# Patient Record
Sex: FEMALE | Race: WHITE | NOT HISPANIC OR LATINO | Employment: OTHER | URBAN - NONMETROPOLITAN AREA
[De-identification: names, ages, dates, MRNs, and addresses within clinical notes are randomized per-mention and may not be internally consistent; named-entity substitution may affect disease eponyms.]

---

## 2023-11-17 PROBLEM — R63.5 ABNORMAL WEIGHT GAIN: Status: ACTIVE | Noted: 2023-11-17

## 2023-11-17 PROBLEM — R05.9 COUGH: Status: ACTIVE | Noted: 2023-11-17

## 2023-11-17 PROBLEM — R93.2 ABNORMAL PET SCAN OF LIVER: Status: ACTIVE | Noted: 2023-11-17

## 2023-11-17 PROBLEM — C50.919 BREAST CANCER (MULTI): Status: ACTIVE | Noted: 2023-11-17

## 2023-11-17 PROBLEM — E66.3 OVERWEIGHT: Status: ACTIVE | Noted: 2023-11-17

## 2023-11-17 PROBLEM — S83.207A ACUTE MENISCAL TEAR OF LEFT KNEE: Status: ACTIVE | Noted: 2023-11-17

## 2023-11-17 PROBLEM — R94.5 ABNORMAL PET SCAN OF LIVER: Status: ACTIVE | Noted: 2023-11-17

## 2023-11-17 PROBLEM — E83.52 HYPERCALCEMIA: Status: ACTIVE | Noted: 2023-11-17

## 2023-11-17 PROBLEM — R39.9 UTI SYMPTOMS: Status: ACTIVE | Noted: 2023-11-17

## 2023-11-17 PROBLEM — E78.5 HYPERLIPEMIA: Status: ACTIVE | Noted: 2023-11-17

## 2023-11-17 PROBLEM — R35.0 URINARY FREQUENCY: Status: ACTIVE | Noted: 2023-11-17

## 2023-11-17 PROBLEM — L59.8 RADIATION SKIN FIBROSIS FROM THERAPEUTIC PROCEDURE: Status: ACTIVE | Noted: 2023-11-17

## 2023-11-17 PROBLEM — F41.9 ANXIETY: Status: ACTIVE | Noted: 2023-11-17

## 2023-11-17 PROBLEM — M76.60 PAIN IN ACHILLES TENDON: Status: ACTIVE | Noted: 2023-11-17

## 2023-11-17 PROBLEM — R10.9 ABDOMINAL PAIN: Status: ACTIVE | Noted: 2023-11-17

## 2023-11-17 PROBLEM — R11.0 NAUSEA IN ADULT: Status: ACTIVE | Noted: 2023-11-17

## 2023-11-17 PROBLEM — T85.44XA CAPSULAR CONTRACTURE OF BREAST IMPLANT: Status: ACTIVE | Noted: 2023-11-17

## 2023-11-17 PROBLEM — R91.8 PULMONARY NODULES/LESIONS, MULTIPLE: Status: ACTIVE | Noted: 2023-11-17

## 2023-11-17 PROBLEM — M25.462 KNEE EFFUSION, LEFT: Status: ACTIVE | Noted: 2023-11-17

## 2023-11-17 PROBLEM — N89.8 VAGINAL ODOR: Status: ACTIVE | Noted: 2023-11-17

## 2023-11-17 PROBLEM — E55.9 VITAMIN D DEFICIENCY: Status: ACTIVE | Noted: 2023-11-17

## 2023-11-17 PROBLEM — Z90.13 STATUS POST BILATERAL MASTECTOMY: Status: ACTIVE | Noted: 2023-11-17

## 2023-11-17 PROBLEM — B37.31 YEAST INFECTION OF THE VAGINA: Status: ACTIVE | Noted: 2023-11-17

## 2023-11-17 PROBLEM — M25.561 RIGHT KNEE PAIN: Status: ACTIVE | Noted: 2023-11-17

## 2023-11-17 PROBLEM — Z85.3 HISTORY OF BREAST CANCER: Status: ACTIVE | Noted: 2023-11-17

## 2023-11-17 PROBLEM — M85.80 OSTEOPENIA: Status: ACTIVE | Noted: 2023-11-17

## 2023-11-17 RX ORDER — CALCIUM CARBONATE 600 MG
600 TABLET ORAL
COMMUNITY

## 2023-11-17 RX ORDER — ONDANSETRON 4 MG/1
4 TABLET, FILM COATED ORAL
COMMUNITY
Start: 2022-03-01 | End: 2024-05-21 | Stop reason: SDUPTHER

## 2023-11-17 RX ORDER — ALENDRONATE SODIUM 70 MG/1
70 TABLET ORAL
COMMUNITY
Start: 2021-12-23

## 2023-11-17 RX ORDER — NALTREXONE HYDROCHLORIDE AND BUPROPION HYDROCHLORIDE 8; 90 MG/1; MG/1
2 TABLET, EXTENDED RELEASE ORAL 2 TIMES DAILY
COMMUNITY
Start: 2022-03-01

## 2023-11-17 RX ORDER — ANASTROZOLE 1 MG/1
TABLET ORAL
COMMUNITY
End: 2024-05-16 | Stop reason: SDUPTHER

## 2023-11-24 ENCOUNTER — APPOINTMENT (OUTPATIENT)
Dept: RADIOLOGY | Facility: CLINIC | Age: 58
End: 2023-11-24
Payer: COMMERCIAL

## 2024-01-05 ENCOUNTER — APPOINTMENT (OUTPATIENT)
Dept: RADIOLOGY | Facility: CLINIC | Age: 59
End: 2024-01-05
Payer: COMMERCIAL

## 2024-01-05 ENCOUNTER — HOSPITAL ENCOUNTER (OUTPATIENT)
Dept: RADIATION ONCOLOGY | Facility: CLINIC | Age: 59
Setting detail: RADIATION/ONCOLOGY SERIES
Discharge: HOME | End: 2024-01-05
Payer: COMMERCIAL

## 2024-01-05 VITALS
DIASTOLIC BLOOD PRESSURE: 74 MMHG | TEMPERATURE: 98.4 F | RESPIRATION RATE: 18 BRPM | SYSTOLIC BLOOD PRESSURE: 136 MMHG | HEART RATE: 74 BPM | OXYGEN SATURATION: 95 %

## 2024-01-05 DIAGNOSIS — C50.919 MALIGNANT NEOPLASM OF FEMALE BREAST, UNSPECIFIED ESTROGEN RECEPTOR STATUS, UNSPECIFIED LATERALITY, UNSPECIFIED SITE OF BREAST (MULTI): Primary | ICD-10-CM

## 2024-01-05 PROCEDURE — 99213 OFFICE O/P EST LOW 20 MIN: CPT | Performed by: NURSE PRACTITIONER

## 2024-01-05 ASSESSMENT — PAIN SCALES - GENERAL: PAINLEVEL: 0-NO PAIN

## 2024-01-05 NOTE — PROGRESS NOTES
Radiation Oncology Follow-Up    Patient Name:  Buffy River  MRN:  [id number]  :  1965    Referring Provider: No ref. provider found  Primary Care Provider: Brittany Eduardo DO  Care Team: Patient Care Team:  Brittany Eduardo DO as PCP - LEANDRA Damian-CNP as PCP - Pierre BROWNING PCP    Date of Service: 2024        Cancer Staging:          Breast         AJCC Edition: 7th (AJCC), Diagnosis Date: 09-Oct-2008, IA, T1b pN0 M0 G1     Treatment Synopsis:    Ms. River is a 58-year-old woman with a history of:   1. T1b N0 M0, stage IA invasive ductal carcinoma of the left breast, status post left mastectomy with sentinel lymph node biopsy in . She received 5 years of tamoxifen. She also had a prophylactic right mastectomy with immediate reconstruction in  .   2. Left chest wall recurrence excised with negative margins in . Following excision of recurrence, she received radiation therapy to the left supraclavicular fossa and left chest wall to a dose of 50 Gy with incision boost to 60 Gy, completed on 2015. Tumor receptors were ER/MS positive, HER-2 negative. She was initiated on Lupron and anastrozole post radiation. She has since had bilateral oophorectomy.     SUBJECTIVE  History of Present Illness:   Buffy River is here today for routine radiation follow up.  She is feeling well with no new health concerns except she has steadily been gaining weight since oophorectomy. She does try to exercise routinely. 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  She continues anastrozole and tolerating  without significant side effects.  Denies: headaches, fever, chills, N/V, chest wall pain, cough, SOB, lymphedema, or bony pain. Continues on bisphosphonate weekly per her GYN provider.        Review of Systems:    Review of Systems - Oncology    Performance Status:   The Karnofsky performance scale today is 100, Fully active, able to carry on all  pre-disease performed without restriction (ECOG equivalent 0).        OBJECTIVE  Vital Signs:  /74 (BP Location: Left arm, Patient Position: Sitting, BP Cuff Size: Adult)   Pulse 74   Temp 36.9 °C (98.4 °F) (Core)   Resp 18   SpO2 95%      Current Outpatient Medications:     alendronate (Fosamax) 70 mg tablet, Take 1 tablet (70 mg) by mouth., Disp: , Rfl:     anastrozole (Arimidex) 1 mg tablet, Take by mouth., Disp: , Rfl:     calcium carbonate 600 mg calcium (1,500 mg) tablet, Take 1 tablet (600 mg) by mouth., Disp: , Rfl:     naltrexone-bupropion (Contrave) 8-90 mg ER tablet, Take 2 tablets by mouth 2 times a day., Disp: , Rfl:     ondansetron (Zofran) 4 mg tablet, Take 1 tablet (4 mg) by mouth every 4 hours., Disp: , Rfl:      Physical Exam  Constitutional:       Appearance: Normal appearance.   HENT:      Head: Normocephalic and atraumatic.      Nose: Nose normal.      Mouth/Throat:      Mouth: Mucous membranes are moist.      Pharynx: Oropharynx is clear.   Eyes:      Conjunctiva/sclera: Conjunctivae normal.      Pupils: Pupils are equal, round, and reactive to light.   Cardiovascular:      Rate and Rhythm: Normal rate and regular rhythm.      Heart sounds: Normal heart sounds. No murmur heard.     No gallop.   Pulmonary:      Effort: Pulmonary effort is normal.      Breath sounds: Normal breath sounds.   Chest:   Breasts:     Right: Absent.      Left: Absent.      Comments: Bilateral mastectomy incisions well healed. Bilateral implant reconstruction. No palpable nodules   Abdominal:      Palpations: Abdomen is soft.   Musculoskeletal:         General: No swelling. Normal range of motion.      Cervical back: Normal range of motion.   Lymphadenopathy:      Cervical: No cervical adenopathy.      Upper Body:      Right upper body: No supraclavicular or axillary adenopathy.      Left upper body: No supraclavicular or axillary adenopathy.   Skin:     General: Skin is warm and dry.   Neurological:       General: No focal deficit present.      Mental Status: She is alert and oriented to person, place, and time.   Psychiatric:         Mood and Affect: Mood normal.         Behavior: Behavior normal.           ASSESSMENT/PLAN:  58 y.o. female with early stage breast cancer in 2000 with chest wall recurrence treated with radiation approx 9 yrs ago.  She is clinically without evidence for recurrence.   She will continue anastrozole and follow up with Krystal Domingo CNP.  I will see her in 1 yr per her request.   She will call with any questions or concerns.     Maricarmen Medina CNP  [phone]

## 2024-01-08 ENCOUNTER — ANCILLARY PROCEDURE (OUTPATIENT)
Dept: RADIOLOGY | Facility: CLINIC | Age: 59
End: 2024-01-08
Payer: COMMERCIAL

## 2024-01-08 DIAGNOSIS — Z78.0 ASYMPTOMATIC MENOPAUSAL STATE: ICD-10-CM

## 2024-01-08 PROCEDURE — 77080 DXA BONE DENSITY AXIAL: CPT

## 2024-01-08 PROCEDURE — 77080 DXA BONE DENSITY AXIAL: CPT | Performed by: RADIOLOGY

## 2024-01-10 NOTE — RESULT ENCOUNTER NOTE
The bone density test was normal at the spine.  There is mild bone thinning or osteopenia of the left hip, T-score -1.2.  I recommend calcium, vitamin D and weightbearing exercise.  The next bone density test is due in 2 or 3 years.

## 2024-05-16 ENCOUNTER — TELEPHONE (OUTPATIENT)
Dept: ADMISSION | Facility: HOSPITAL | Age: 59
End: 2024-05-16
Payer: COMMERCIAL

## 2024-05-16 DIAGNOSIS — C50.919 MALIGNANT NEOPLASM OF FEMALE BREAST, UNSPECIFIED ESTROGEN RECEPTOR STATUS, UNSPECIFIED LATERALITY, UNSPECIFIED SITE OF BREAST (MULTI): Primary | ICD-10-CM

## 2024-05-16 RX ORDER — ANASTROZOLE 1 MG/1
1 TABLET ORAL DAILY
Qty: 90 TABLET | Refills: 3 | Status: SHIPPED | OUTPATIENT
Start: 2024-05-16 | End: 2025-05-16

## 2024-05-16 NOTE — TELEPHONE ENCOUNTER
Buffy River called the refill line for Anastrozole. Medication pended to team to approve and submit. Next FUV is 05/21/24.

## 2024-05-17 ENCOUNTER — TELEPHONE (OUTPATIENT)
Dept: ADMISSION | Facility: HOSPITAL | Age: 59
End: 2024-05-17
Payer: COMMERCIAL

## 2024-05-17 NOTE — TELEPHONE ENCOUNTER
Buffy called and thought she needed a week of Anastrazole since her RX at [facility] would take a week to get here but she realized on phone she has enough Anastrazole and doesn't need anymore pills until her [facility] delivery arrives. She will call us if has any further needs.

## 2024-05-20 PROBLEM — Z78.0 MENOPAUSE: Status: ACTIVE | Noted: 2024-05-20

## 2024-05-20 PROBLEM — Z85.3 ENCOUNTER FOR FOLLOW-UP SURVEILLANCE OF BREAST CANCER: Status: ACTIVE | Noted: 2024-05-20

## 2024-05-20 PROBLEM — C50.912 MALIGNANT NEOPLASM OF LEFT BREAST IN FEMALE, ESTROGEN RECEPTOR POSITIVE (MULTI): Status: ACTIVE | Noted: 2024-05-20

## 2024-05-20 PROBLEM — Z08 ENCOUNTER FOR FOLLOW-UP SURVEILLANCE OF BREAST CANCER: Status: ACTIVE | Noted: 2024-05-20

## 2024-05-20 PROBLEM — Z17.0 MALIGNANT NEOPLASM OF LEFT BREAST IN FEMALE, ESTROGEN RECEPTOR POSITIVE (MULTI): Status: ACTIVE | Noted: 2024-05-20

## 2024-05-20 PROBLEM — Z79.811 AROMATASE INHIBITOR USE: Status: ACTIVE | Noted: 2024-05-20

## 2024-05-21 ENCOUNTER — OFFICE VISIT (OUTPATIENT)
Dept: HEMATOLOGY/ONCOLOGY | Facility: CLINIC | Age: 59
End: 2024-05-21
Payer: COMMERCIAL

## 2024-05-21 VITALS
SYSTOLIC BLOOD PRESSURE: 118 MMHG | DIASTOLIC BLOOD PRESSURE: 73 MMHG | HEART RATE: 73 BPM | BODY MASS INDEX: 28.12 KG/M2 | HEIGHT: 70 IN

## 2024-05-21 DIAGNOSIS — Z17.0 MALIGNANT NEOPLASM OF LEFT BREAST IN FEMALE, ESTROGEN RECEPTOR POSITIVE, UNSPECIFIED SITE OF BREAST (MULTI): ICD-10-CM

## 2024-05-21 DIAGNOSIS — Z79.811 AROMATASE INHIBITOR USE: ICD-10-CM

## 2024-05-21 DIAGNOSIS — M85.80 OSTEOPENIA, UNSPECIFIED LOCATION: ICD-10-CM

## 2024-05-21 DIAGNOSIS — Z85.3 ENCOUNTER FOR FOLLOW-UP SURVEILLANCE OF BREAST CANCER: ICD-10-CM

## 2024-05-21 DIAGNOSIS — Z90.13 STATUS POST BILATERAL MASTECTOMY: Primary | ICD-10-CM

## 2024-05-21 DIAGNOSIS — Z87.898 HX OF NAUSEA: ICD-10-CM

## 2024-05-21 DIAGNOSIS — C50.912 MALIGNANT NEOPLASM OF LEFT BREAST IN FEMALE, ESTROGEN RECEPTOR POSITIVE, UNSPECIFIED SITE OF BREAST (MULTI): ICD-10-CM

## 2024-05-21 DIAGNOSIS — Z78.0 MENOPAUSE: ICD-10-CM

## 2024-05-21 DIAGNOSIS — Z08 ENCOUNTER FOR FOLLOW-UP SURVEILLANCE OF BREAST CANCER: ICD-10-CM

## 2024-05-21 PROCEDURE — 99215 OFFICE O/P EST HI 40 MIN: CPT | Performed by: NURSE PRACTITIONER

## 2024-05-21 RX ORDER — ONDANSETRON 4 MG/1
4 TABLET, FILM COATED ORAL EVERY 12 HOURS PRN
Qty: 3 TABLET | Refills: 0 | Status: SHIPPED | OUTPATIENT
Start: 2024-05-21

## 2024-05-21 ASSESSMENT — PAIN SCALES - GENERAL: PAINLEVEL: 0-NO PAIN

## 2024-05-21 NOTE — PATIENT INSTRUCTIONS
Continue daily Anastrazole. I have updated the prescription today for a full year    PRN prescription today for intermittent rare nausea.      DEXA osteopenia 11/2021, repeat Jan 2024 unchanged osteopenia. Continue weight bearing, vitamin D and calcium supplementation.      Krystal MOBLEY CNP follow-up annually May/ June 2025     follow-up with Dr. Sri Nice gyn as scheduled       follow-up office visit with Maricarmen Medina CNP annually Job  10 2025     PCP follow-up with Dr. Mills annually and as needed for general health maintenance and labs     Call with any questions, concerns or treatment intolerance prior to next office visit [phone].

## 2024-05-21 NOTE — PROGRESS NOTES
"Patient ID: Buffy River is a 58 y.o. female.  BREAST CANCER DIAGNOSIS:  Breast         AJCC Edition: 7th (AJCC), Diagnosis Date: 09-Oct-2008, IA, T1b pN0 M0 G1        Treatment History:    1. Mammogram 3/27/08.  Subsequently, presented with left  breast nipple discharge and underwent a mammogram on 8/26/08 that was negative but ultrasound revealed nodules.  MRI breast 9/10/08 revealed 3 masses. 9/17/08 - U/S guided biopsy of 3 masses in left breast- 12:00 mass, 10-11:00 mass, 9-10:00 mass and all 3 revealed invasive ductal carcinoma, grade 1.     2. 10/9/08- Bilateral mastectomies which revealed left breast multifocal areas of IDC , grade 1, largest 0.9 cm, negative margins, 0/5 LN involved, no angiolymphatic invasion;  invasive ductal carcinoma, ER+ 96%, MD+ 75%, HER2 1+.  Right breast revealed 1.3cm area of  LCIS.  Oncotype dx recurrence score of 10 .  Path staging- [id number]     3. 11/19/08- 11/2013- adjuvant tamoxifen.  Was also on Zometa Q6 M 1/2009-3/2011 along with  tamoxifen.     4. BRCA germline test negative per clinic notes around 2008.      5. There was a \"red dot\" on her left reconstructed nipple 5/2015  and showed this to her dermatologist. Skin biopsy showed low-grade ductal mammary adenocarcinoma. She was also found to have a mass in the superficial area of the left chest wall near the reconstructed areola.  Biopsy 5/28/15 showed invasive ductal carcinoma, grade 1, ER >95% , MD >95%, HER-2 negative.     6. PET/CT showed no evidence of metastatic disease. Her case was discussed at interdisciplinary breast tumor conference yesterday. The recommendations were for an excision of the mass and overlying skin, postoperative radiation, medical oncology referral  and hormonal therapy. Genetics consult and consideration of chemotherapy recommended.     7. Left mastectomy re-excision 6/18/15 showed an invasive ductal carcinoma that  was about 2 cm in size.      8. Radiation therapy to the left chest wall and " supraclavicular region 8/13/15 through 9/22/15     9. Lupron initiated 8/11/15 as she was still having menses regularly.     10. Anastrozole initiated mid 2015    11. 2016 Oophorectomy       Past Medical History: Buffy has a past medical history of Personal history of malignant neoplasm of breast.  Surgical History:  Buffy has a past surgical history that includes Other surgical history (2014) and Other surgical history (2019).  Social History: Moved to [place] 2023, , x2 adult daughters. Runs a local [employer]   Social Substance History:  ·  Smoking Status never smoker (1)   ·  Additional History          Breast cancer risk factors:   Menarche 12    Breastfeeding- 2.5 yrs for one and 1 yr for the other  Age at 1st live birth 34  OCP- clomid for 1 yr at age 14  Fertility drugs- injections for 1.5 yrs  Prior XRT- none  Prior breast biopsy- none prior to breast cancer diagnosis      Family History & Family Oncology History:  Maternal GM dx'ed with BC at 40 and  at 44.  Maternal aunt dx'ed with BC at 65 and is alive at 91.  Mother lung cancer at 75 and  at 89.  Father had stomach cancer at 83.  Paternal GM had stomach cancer at 83.  Pt does not have any brothers or  sisters.      HISTORY OF PRESENT ILLNESS:  Buffy River is a 58 y.o. female who is her for Breast cancer surveillance and  treatment follow-up. Continued on daily anastrazole. She has no breast cancer concerns today. She is doing well, is exercising.  She down 30 lbs and is happy with her weight- has utilized more exercise and walking and intentional diet changes.     She is still living in [place]. 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 She is still on Fosamax with GYN.      She denies any chest pain or breathing issues, no cough or short of breath     She denies any vision changes or headache issues, dizziness or loss of balance, no falls.     "  She denies any new or unexplained bone aches or pains. She has baseline issues with Left knee aches.       She denies any skin lesions or masses, oral sores lesions or infections with exception to lipomas that are her normal.      She reports a normal appetite, normal bowels, normal urination.      She reports rotational issues with sleep. Denies fatigue.      She takes daily vit D & Calcium. She continues to stay active, plays pickle ball, walks, bikes.     Review of Systems - Oncology  ROS 14 points performed, See HPI for exceptions    OBJECTIVE:    VS / Pain:  /73   Pulse 73   Ht 1.778 m (5' 10\")   BMI 28.12 kg/m²   BSA: 2.1 meters squared   Pain Scale: 0  ECO- Fully active, able to carry on all pre-disease performance w/o restriction.       Physical Exam    Constitutional: Well developed, awake/alert/oriented x4, no distress, alert and cooperative  EYES: Sclera clear  ENMT: mucous membranes moist, no apparent injury, no lesions seen  Head/Neck: Neck supple, no apparent injury, thyroid without mass or tenderness, No JVD, trachea midline, no bruits  Respiratory / Thoracic: Patent airways, clear to all lobes, normal breath sounds with good chest expansion, thorax symmetric.  Cardiovascular: Regular, rate and rhythm, no murmurs, 2+ equal pulses of the extremities, normal auscultated S 1and S 2  GI: Nondistended, soft, non-tender, no rebound tenderness or guarding, no masses palpable, no organomegaly, +BS, no bruits  Musculoskeletal: ROM intact, no joint swelling, normal strength, no spinal tenderness  Extremities: normal extremities, no cyanosis edema, contusions or wounds, no clubbing  Neurological: alert and oriented x4, intact senses, motor, response and reflexes, normal strength  Breast: No masses, tenderness, no discharge or discoloration  in either  reconstructed breast; Implant reconstruction  Lymphatic: No cervical, supraclavicular, infraclavicular or axillary lymphadenopathy  Psychological: " Appropriate and talkative mood and behavior  Skin: Warm and dry, no lesions, no rashes with the exception of baseline right and left forearm lipoma <1 cm, right lower back lipoma       Diagnostic Results   XR DEXA bone density  Narrative: Interpreted By:  Alfonso Penaloza,   STUDY:  DEXA BONE DENSITY1/8/2024 1:19 pm      INDICATION:  Signs/Symptoms: menopause  Z78.0: Menopause.  The patient is a 59 y/o  year old F.      COMPARISON:  None.      ACCESSION NUMBER(S):  [id number]      ORDERING CLINICIAN:  STANLEY HURLEY      TECHNIQUE:  DEXA BONE DENSITY      FINDINGS:  SPINE L1-L4  Bone Mineral Density: 1.125  T-Score -0.6  Z-Score 0.6  Bone Mineral Density change vs baseline:  Not reported  Bone Mineral Density change vs previous: Not reported      LEFT FEMUR -TOTAL  Bone Mineral Density: 0.895  T-Score -0.9   Z-Score  0.0  Bone Mineral Density change vs baseline: Not reported  Bone Mineral Density change vs previous: Not reported      LEFT FEMUR -NECK  Bone Mineral Density: 0.878  T-Score -1.2  Z-Score 0.1      RIGHT FEMUR -TOTAL  Bone Mineral Density: 0.867  T-Score -1.1   Z-Score -0.2  Bone Mineral Density change vs baseline:  Not reported  Bone Mineral Density change vs previous: Not reported      RIGHT FEMUR -NECK  Bone Mineral Density: 0.784  T-Score -1.8   Z-Score  -0.6          World Health Organization (WHO) criteria for post-menopausal,   Women:  Normal:         T-score at or above -1 SD  Osteopenia:   T-score between -1 and -2.5 SD  Osteoporosis: T-score at or below -2.5 SD          10-year Fracture Risk:  Major Osteoporotic Fracture  8.7  Hip Fracture                        1.0      Note:  If no FRAX score is reported, it is because:  Some T-score for Spine Total or Hip Total or Femoral Neck at or below  -2.5          This exam was performed at [facility] on a [employer] Dexa Unit.      Impression: DEXA:  According to World Health Organization  criteria,  classification is low bone mass (osteopenia)      Followup recommended in two years or sooner as clinically warranted.          All images and detailed analysis are available on the  Radiology  PACS.      MACRO:  None      Signed by: Alfonso Penaloza 1/9/2024 1:58 PM  Dictation workstation:   [id number]               Assessment/Plan   Malignant neoplasm of left breast in female, estrogen receptor positive (Multi), Clinical: Stage IA (cT1b, cN0, cM0, G1, ER+, GA+, HER2-)  Malignant neoplasm of left breast in female, estrogen receptor positive (Multi), Pathologic: Stage IA (pT2, pN0, cM0, G1, ER+, GA+, HER2-)  Buffy was seen today for follow-up.  Diagnoses and all orders for this visit:  Status post bilateral mastectomy (Primary)  -     ondansetron (Zofran) 4 mg tablet; Take 1 tablet (4 mg) by mouth every 12 hours if needed for nausea or vomiting for up to 3 doses.  -     Clinic Appointment Request Follow up; ALIZA BROWN; Future  Malignant neoplasm of left breast in female, estrogen receptor positive, unspecified site of breast (Multi)  -     ondansetron (Zofran) 4 mg tablet; Take 1 tablet (4 mg) by mouth every 12 hours if needed for nausea or vomiting for up to 3 doses.  -     Clinic Appointment Request Follow up; ALIZA BROWN; Future  Aromatase inhibitor use  -     ondansetron (Zofran) 4 mg tablet; Take 1 tablet (4 mg) by mouth every 12 hours if needed for nausea or vomiting for up to 3 doses.  -     Clinic Appointment Request Follow up; ALIZA BROWN; Future  Menopause  -     ondansetron (Zofran) 4 mg tablet; Take 1 tablet (4 mg) by mouth every 12 hours if needed for nausea or vomiting for up to 3 doses.  -     Clinic Appointment Request Follow up; ALIZA BROWN; Future  Osteopenia, unspecified location  -     ondansetron (Zofran) 4 mg tablet; Take 1 tablet (4 mg) by mouth every 12 hours if needed for nausea or vomiting for up to 3 doses.  -     Clinic Appointment Request  Follow up; ALIZA BROWN; Future  Encounter for follow-up surveillance of breast cancer  -     ondansetron (Zofran) 4 mg tablet; Take 1 tablet (4 mg) by mouth every 12 hours if needed for nausea or vomiting for up to 3 doses.  -     Clinic Appointment Request Follow up; ALIZA BROWN; Future  Hx of nausea    Problem List Items Addressed This Visit       Osteopenia    Relevant Medications    ondansetron (Zofran) 4 mg tablet    Other Relevant Orders    Clinic Appointment Request Follow up; ALIZA BROWN    Status post bilateral mastectomy - Primary    Relevant Medications    ondansetron (Zofran) 4 mg tablet    Other Relevant Orders    Clinic Appointment Request Follow up; ALIZA BROWN    Malignant neoplasm of left breast in female, estrogen receptor positive (Multi)    Relevant Medications    ondansetron (Zofran) 4 mg tablet    Other Relevant Orders    Clinic Appointment Request Follow up; ALIZA BROWN    Aromatase inhibitor use    Relevant Medications    ondansetron (Zofran) 4 mg tablet    Other Relevant Orders    Clinic Appointment Request Follow up; ALIZA BROWN    Menopause    Relevant Medications    ondansetron (Zofran) 4 mg tablet    Other Relevant Orders    Clinic Appointment Request Follow up; ALIZA BROWN    Encounter for follow-up surveillance of breast cancer    Relevant Medications    ondansetron (Zofran) 4 mg tablet    Other Relevant Orders    Clinic Appointment Request Follow up; ALIZA BROWN    Hx of nausea   Breast Cancer  Left breast cancer recurrent to skin treated with excision, radiation and hormonal treatment with Lupron and anastrazole. She is s/p oophorectomy 5/31/2016. Anastrazole since 10/2015 and still tolerating this well. Previously discussed plan with Dr. Ortega because of skin recurrence, she remains at risk for another metastasis. Planned 10 year course of hormonal treatment through October 2025.      Genetic Testing performed in  2008 with first breast cancer.      There is no evidence of breast cancer recurrence based on her clinical exam today. She continues to follow in surveillance with myself and Maricarmen Medina CNP through 10 years however given 10 year plan with anastrozole I will see her through 11 th year. We have reviewed she will then transfer to PCP. She is agreeable with this plan    Bone Health  Nov 2021 T-score -1.7, essentially unchanged Repeat DEXA 1/2024 T-score -1.8. Continued encouragement weight bearing exercises, calcium and Vit D and continues on Fosamax with GYN     General health Maintenance.  PCP Dr Mills on [place]  and GYN Dr Nice annually and as needed  Continued follow up with Dr Saba Domingo for thyroid cyst in [place]     Intermittent rare nausea, is likely not related to AI therapy, however I feel she has childhood trauma from mother being sick. I have agreed again this visit to send in a prescription today, however this will need to transition to PCP moving forward. I continue to encourage her to see a therapist to discuss fears of Nausea and vomiting             At least 25 minutes of direct consultation was spent with the patient today reviewing her cancer care plan, educating and answering questions regarding ongoing follow up, greater than 50% in counseling and coordination of care.     Treatment Plan:  [No matching plan found]      Thank you for the opportunity to be involved in the care of Buffy River.    We discussed the clinical significance of diagnosis, goals of care and treatment plan in detail.   Please do not hesitate to reach out with any questions. Thank you.     -------------------------------------------------------------------------------------------------------------------------------  Krystal Domingo MSN, APRN, FNP-C  [facility]  Division of Medical Oncology- Breast   Collaborating Physician Dr. Yang Pozo   Team Nurse Partners Debby Arteaga and Esperanza  [facility]  [address] [place], [place] [address]  Phone: [phone]  Fax: [phone]  Available via Secure Chat    Confidential Peer Review Document-  Privilege  Privileged Pursuant to [place] Revised Code Section 2305.24, .25, .251 & .252

## 2024-10-30 ENCOUNTER — APPOINTMENT (OUTPATIENT)
Dept: OBSTETRICS AND GYNECOLOGY | Facility: CLINIC | Age: 59
End: 2024-10-30
Payer: COMMERCIAL

## 2024-10-30 VITALS — DIASTOLIC BLOOD PRESSURE: 78 MMHG | HEIGHT: 70 IN | BODY MASS INDEX: 28.12 KG/M2 | SYSTOLIC BLOOD PRESSURE: 120 MMHG

## 2024-10-30 DIAGNOSIS — Z78.0 MENOPAUSE: ICD-10-CM

## 2024-10-30 DIAGNOSIS — Z08 ENCOUNTER FOR FOLLOW-UP SURVEILLANCE OF BREAST CANCER: ICD-10-CM

## 2024-10-30 DIAGNOSIS — Z01.419 ENCOUNTER FOR GYNECOLOGICAL EXAMINATION WITHOUT ABNORMAL FINDING: Primary | ICD-10-CM

## 2024-10-30 DIAGNOSIS — Z90.13 STATUS POST BILATERAL MASTECTOMY: ICD-10-CM

## 2024-10-30 DIAGNOSIS — C50.912 MALIGNANT NEOPLASM OF LEFT BREAST IN FEMALE, ESTROGEN RECEPTOR POSITIVE, UNSPECIFIED SITE OF BREAST: ICD-10-CM

## 2024-10-30 DIAGNOSIS — Z79.811 AROMATASE INHIBITOR USE: ICD-10-CM

## 2024-10-30 DIAGNOSIS — M85.80 OSTEOPENIA, UNSPECIFIED LOCATION: ICD-10-CM

## 2024-10-30 DIAGNOSIS — Z85.3 ENCOUNTER FOR FOLLOW-UP SURVEILLANCE OF BREAST CANCER: ICD-10-CM

## 2024-10-30 DIAGNOSIS — Z17.0 MALIGNANT NEOPLASM OF LEFT BREAST IN FEMALE, ESTROGEN RECEPTOR POSITIVE, UNSPECIFIED SITE OF BREAST: ICD-10-CM

## 2024-10-30 PROCEDURE — 1036F TOBACCO NON-USER: CPT | Performed by: OBSTETRICS & GYNECOLOGY

## 2024-10-30 PROCEDURE — 99396 PREV VISIT EST AGE 40-64: CPT | Performed by: OBSTETRICS & GYNECOLOGY

## 2024-10-30 RX ORDER — ONDANSETRON 4 MG/1
4 TABLET, FILM COATED ORAL EVERY 12 HOURS PRN
Qty: 30 TABLET | Refills: 1 | Status: SHIPPED | OUTPATIENT
Start: 2024-10-30 | End: 2025-10-30

## 2025-01-10 ENCOUNTER — APPOINTMENT (OUTPATIENT)
Dept: RADIATION ONCOLOGY | Facility: CLINIC | Age: 60
End: 2025-01-10
Payer: COMMERCIAL

## 2025-01-24 ENCOUNTER — APPOINTMENT (OUTPATIENT)
Dept: RADIATION ONCOLOGY | Facility: CLINIC | Age: 60
End: 2025-01-24
Payer: COMMERCIAL

## 2025-03-19 DIAGNOSIS — C50.919 MALIGNANT NEOPLASM OF FEMALE BREAST, UNSPECIFIED ESTROGEN RECEPTOR STATUS, UNSPECIFIED LATERALITY, UNSPECIFIED SITE OF BREAST: ICD-10-CM

## 2025-03-19 RX ORDER — ANASTROZOLE 1 MG/1
1 TABLET ORAL DAILY
Qty: 90 TABLET | Refills: 0 | Status: SHIPPED | OUTPATIENT
Start: 2025-03-19 | End: 2026-03-19

## 2025-03-19 RX ORDER — ANASTROZOLE 1 MG/1
1 TABLET ORAL DAILY
Qty: 90 TABLET | Refills: 0 | Status: SHIPPED | OUTPATIENT
Start: 2025-03-19 | End: 2025-03-19 | Stop reason: SDUPTHER

## 2025-03-19 NOTE — TELEPHONE ENCOUNTER
Buffy River called the refill line for Anastrozole. Medication pended to team to approve and submit. Next FUV is 5/21

## 2025-04-11 ENCOUNTER — HOSPITAL ENCOUNTER (OUTPATIENT)
Dept: RADIATION ONCOLOGY | Facility: CLINIC | Age: 60
Setting detail: RADIATION/ONCOLOGY SERIES
Discharge: HOME | End: 2025-04-11
Payer: COMMERCIAL

## 2025-04-11 VITALS
DIASTOLIC BLOOD PRESSURE: 82 MMHG | OXYGEN SATURATION: 94 % | TEMPERATURE: 97.7 F | SYSTOLIC BLOOD PRESSURE: 117 MMHG | RESPIRATION RATE: 18 BRPM | HEART RATE: 77 BPM

## 2025-04-11 PROCEDURE — 99213 OFFICE O/P EST LOW 20 MIN: CPT | Performed by: NURSE PRACTITIONER

## 2025-04-11 ASSESSMENT — ENCOUNTER SYMPTOMS
DEPRESSION: 0
OCCASIONAL FEELINGS OF UNSTEADINESS: 0
LOSS OF SENSATION IN FEET: 0

## 2025-04-11 ASSESSMENT — PAIN SCALES - GENERAL: PAINLEVEL_OUTOF10: 0-NO PAIN

## 2025-04-11 NOTE — PROGRESS NOTES
Radiation Oncology Follow-Up    Patient Name:  Buffy River  MRN:  [id number]  :  1965    Referring Provider: Eilana Medina, APR*  Primary Care Provider: Brittany Eduardo DO  Care Team: Patient Care Team:  Brittany Eduardo DO as PCP - General    Date of Service: 2025        Cancer Staging:          Breast         AJCC Edition: 7th (AJCC), Diagnosis Date: 09-Oct-2008, IA, T1b pN0 M0 G1     Treatment Synopsis:    Ms. River is a 59-year-old woman with a history of:   1. T1b N0 M0, stage IA invasive ductal carcinoma of the left breast, status post left mastectomy with sentinel lymph node biopsy in . She received 5 years of tamoxifen. She also had a prophylactic right mastectomy with immediate reconstruction in  .   2. Left chest wall recurrence excised with negative margins in . Following excision of recurrence, she received radiation therapy to the left supraclavicular fossa and left chest wall to a dose of 50 Gy with incision boost to 60 Gy, completed on 2015. Tumor receptors were ER/NH positive, HER-2 negative. She was initiated on Lupron and anastrozole post radiation. She has since had bilateral oophorectomy.     SUBJECTIVE  History of Present Illness:   Buffy River is here today for routine radiation follow up.  She is feeling well with no new health concerns except she has steadily been gaining weight since oophorectomy but seems to have leveled off. She does try to exercise routinely. 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  She continues anastrozole and tolerating  without significant side effects.  Denies: headaches, fever, chills, N/V, chest wall pain, cough, SOB, lymphedema, or bony pain. Continues on bisphosphonate weekly per her GYN provider.        Review of Systems:    Review of Systems - Oncology    Performance Status:   The Karnofsky performance scale today is 100, Fully active, able to carry on all pre-disease performed  without restriction (ECOG equivalent 0).        OBJECTIVE  Vital Signs:  /82 (BP Location: Right arm, Patient Position: Sitting, BP Cuff Size: Large adult)   Pulse 77   Temp 36.5 °C (97.7 °F) (Core)   Resp 18   LMP  (LMP Unknown)   SpO2 94%      Current Outpatient Medications:     alendronate (Fosamax) 70 mg tablet, Take 1 tablet (70 mg) by mouth., Disp: , Rfl:     anastrozole (Arimidex) 1 mg tablet, Take 1 tablet (1 mg total) by mouth once daily., Disp: 90 tablet, Rfl: 0    calcium carbonate 600 mg calcium (1,500 mg) tablet, Take 1 tablet (600 mg) by mouth., Disp: , Rfl:     ondansetron (Zofran) 4 mg tablet, Take 1 tablet (4 mg) by mouth every 12 hours if needed for nausea or vomiting., Disp: 30 tablet, Rfl: 1     Physical Exam  Constitutional:       Appearance: Normal appearance.   HENT:      Head: Normocephalic and atraumatic.      Nose: Nose normal.      Mouth/Throat:      Mouth: Mucous membranes are moist.      Pharynx: Oropharynx is clear.   Eyes:      Conjunctiva/sclera: Conjunctivae normal.      Pupils: Pupils are equal, round, and reactive to light.   Cardiovascular:      Rate and Rhythm: Normal rate and regular rhythm.      Heart sounds: Normal heart sounds. No murmur heard.     No gallop.   Pulmonary:      Effort: Pulmonary effort is normal.      Breath sounds: Normal breath sounds.   Chest:   Breasts:     Right: Absent.      Left: Absent.      Comments: Bilateral mastectomy incisions well healed. Bilateral implant reconstruction. No palpable nodules   Abdominal:      Palpations: Abdomen is soft.   Musculoskeletal:         General: No swelling. Normal range of motion.      Cervical back: Normal range of motion.   Lymphadenopathy:      Cervical: No cervical adenopathy.      Upper Body:      Right upper body: No supraclavicular or axillary adenopathy.      Left upper body: No supraclavicular or axillary adenopathy.   Skin:     General: Skin is warm and dry.   Neurological:      General: No focal  deficit present.      Mental Status: She is alert and oriented to person, place, and time.   Psychiatric:         Mood and Affect: Mood normal.         Behavior: Behavior normal.         ASSESSMENT/PLAN:  59 y.o. female with early stage breast cancer in 2000 with chest wall recurrence treated with radiation approx 10 yrs ago.  She is clinically without evidence for recurrence.   She will continue anastrozole and follow up with Krystal Domingo CNP.  Radiation follow up now on prn basis.  She will call with any questions or concerns.     Maricarmen Medina CNP  [phone]

## 2025-05-19 ENCOUNTER — APPOINTMENT (OUTPATIENT)
Dept: URBAN - METROPOLITAN AREA CLINIC 186 | Age: 60
Setting detail: DERMATOLOGY
End: 2025-05-19

## 2025-05-19 DIAGNOSIS — L72.0 EPIDERMAL CYST: ICD-10-CM

## 2025-05-19 PROCEDURE — OTHER SUNSCREEN TREATMENT REGIMEN: OTHER

## 2025-05-19 PROCEDURE — OTHER ADDITIONAL NOTES: OTHER

## 2025-05-19 PROCEDURE — OTHER DEFER: OTHER

## 2025-05-19 PROCEDURE — OTHER COUNSELING: OTHER

## 2025-05-19 PROCEDURE — 99203 OFFICE O/P NEW LOW 30 MIN: CPT

## 2025-05-19 ASSESSMENT — LOCATION SIMPLE DESCRIPTION DERM: LOCATION SIMPLE: LEFT LOWER BACK

## 2025-05-19 ASSESSMENT — LOCATION DETAILED DESCRIPTION DERM: LOCATION DETAILED: LEFT SUPERIOR MEDIAL MIDBACK

## 2025-05-19 ASSESSMENT — LOCATION ZONE DERM: LOCATION ZONE: TRUNK

## 2025-05-21 ENCOUNTER — APPOINTMENT (OUTPATIENT)
Dept: HEMATOLOGY/ONCOLOGY | Facility: CLINIC | Age: 60
End: 2025-05-21
Payer: COMMERCIAL

## 2025-05-21 ENCOUNTER — OFFICE VISIT (OUTPATIENT)
Dept: HEMATOLOGY/ONCOLOGY | Facility: CLINIC | Age: 60
End: 2025-05-21
Payer: COMMERCIAL

## 2025-05-21 VITALS
BODY MASS INDEX: 28.12 KG/M2 | RESPIRATION RATE: 20 BRPM | TEMPERATURE: 98 F | HEART RATE: 74 BPM | OXYGEN SATURATION: 94 % | HEIGHT: 70 IN | SYSTOLIC BLOOD PRESSURE: 127 MMHG | DIASTOLIC BLOOD PRESSURE: 86 MMHG

## 2025-05-21 DIAGNOSIS — C50.912 MALIGNANT NEOPLASM OF LEFT BREAST IN FEMALE, ESTROGEN RECEPTOR POSITIVE, UNSPECIFIED SITE OF BREAST: ICD-10-CM

## 2025-05-21 DIAGNOSIS — C50.919 MALIGNANT NEOPLASM OF FEMALE BREAST, UNSPECIFIED ESTROGEN RECEPTOR STATUS, UNSPECIFIED LATERALITY, UNSPECIFIED SITE OF BREAST: ICD-10-CM

## 2025-05-21 DIAGNOSIS — Z08 ENCOUNTER FOR FOLLOW-UP SURVEILLANCE OF BREAST CANCER: ICD-10-CM

## 2025-05-21 DIAGNOSIS — Z17.0 MALIGNANT NEOPLASM OF LEFT BREAST IN FEMALE, ESTROGEN RECEPTOR POSITIVE, UNSPECIFIED SITE OF BREAST: ICD-10-CM

## 2025-05-21 DIAGNOSIS — Z85.3 ENCOUNTER FOR FOLLOW-UP SURVEILLANCE OF BREAST CANCER: ICD-10-CM

## 2025-05-21 DIAGNOSIS — Z90.13 STATUS POST BILATERAL MASTECTOMY: ICD-10-CM

## 2025-05-21 DIAGNOSIS — M85.80 OSTEOPENIA, UNSPECIFIED LOCATION: ICD-10-CM

## 2025-05-21 DIAGNOSIS — Z79.811 AROMATASE INHIBITOR USE: ICD-10-CM

## 2025-05-21 DIAGNOSIS — Z78.0 MENOPAUSE: ICD-10-CM

## 2025-05-21 PROCEDURE — 99214 OFFICE O/P EST MOD 30 MIN: CPT | Performed by: NURSE PRACTITIONER

## 2025-05-21 PROCEDURE — 1036F TOBACCO NON-USER: CPT | Performed by: NURSE PRACTITIONER

## 2025-05-21 RX ORDER — ANASTROZOLE 1 MG/1
1 TABLET ORAL DAILY
Qty: 90 TABLET | Refills: 1 | Status: SHIPPED | OUTPATIENT
Start: 2025-05-21 | End: 2025-11-17

## 2025-05-21 ASSESSMENT — PAIN SCALES - GENERAL: PAINLEVEL_OUTOF10: 0-NO PAIN

## 2025-05-21 NOTE — PATIENT INSTRUCTIONS
Continue daily Anastrazole. I have updated the prescription today with planned completion October 2025    DEXA osteopenia 11/2021, repeat Jan 2024 unchanged osteopenia. Continue weight bearing, vitamin D and calcium supplementation. Orders placed for re testing 3/2026     Krystal MOBLEY CNP follow-up annually May/ June 2026     follow-up with Dr. Sri Nice gyn as scheduled       follow-up office visit with Maricarmen Medina CNP has completed     PCP follow-up with Dr. Mills annually and as needed for general health maintenance and labs     Call with any questions, concerns or treatment intolerance prior to next office visit [phone].

## 2025-05-21 NOTE — PROGRESS NOTES
"Patient ID: Buffy River is a 59 y.o. female.  BREAST CANCER DIAGNOSIS:  Breast         AJCC Edition: 7th (AJCC), Diagnosis Date: 09-Oct-2008, IA, T1b pN0 M0 G1        Treatment History:    1. Mammogram 3/27/08.  Subsequently, presented with left  breast nipple discharge and underwent a mammogram on 8/26/08 that was negative but ultrasound revealed nodules.  MRI breast 9/10/08 revealed 3 masses. 9/17/08 - U/S guided biopsy of 3 masses in left breast- 12:00 mass, 10-11:00 mass, 9-10:00 mass and all 3 revealed invasive ductal carcinoma, grade 1.     2. 10/9/08- Bilateral mastectomies which revealed left breast multifocal areas of IDC , grade 1, largest 0.9 cm, negative margins, 0/5 LN involved, no angiolymphatic invasion;  invasive ductal carcinoma, ER+ 96%, NV+ 75%, HER2 1+.  Right breast revealed 1.3cm area of  LCIS.  Oncotype dx recurrence score of 10 .  Path staging- [id number]     3. 11/19/08- 11/2013- adjuvant tamoxifen.  Was also on Zometa Q6 M 1/2009-3/2011 along with  tamoxifen.     4. BRCA germline test negative per clinic notes around 2008.      5. There was a \"red dot\" on her left reconstructed nipple 5/2015  and showed this to her dermatologist. Skin biopsy showed low-grade ductal mammary adenocarcinoma. She was also found to have a mass in the superficial area of the left chest wall near the reconstructed areola.  Biopsy 5/28/15 showed invasive ductal carcinoma, grade 1, ER >95% , NV >95%, HER-2 negative.     6. PET/CT showed no evidence of metastatic disease. Her case was discussed at interdisciplinary breast tumor conference yesterday. The recommendations were for an excision of the mass and overlying skin, postoperative radiation, medical oncology referral  and hormonal therapy. Genetics consult and consideration of chemotherapy recommended.     7. Left mastectomy re-excision 6/18/15 showed an invasive ductal carcinoma that  was about 2 cm in size.      8. Radiation therapy to the left chest wall and " supraclavicular region 8/13/15 through 9/22/15     9. Lupron initiated 8/11/15 as she was still having menses regularly.     10. Anastrozole initiated mid 2015    11. 2016 Oophorectomy       Past Medical History: Buffy has a past medical history of Personal history of malignant neoplasm of breast.  Surgical History:  Buffy has a past surgical history that includes Other surgical history (2014) and Other surgical history (2019).  Social History: Moved to [place] 2023, , x2 adult daughters. Runs a local [employer]   Social Substance History:  ·  Smoking Status never smoker (1)   ·  Additional History          Breast cancer risk factors:   Menarche 12    Breastfeeding- 2.5 yrs for one and 1 yr for the other  Age at 1st live birth 34  OCP- clomid for 1 yr at age 14  Fertility drugs- injections for 1.5 yrs  Prior XRT- none  Prior breast biopsy- none prior to breast cancer diagnosis      Family History & Family Oncology History:  Maternal GM dx'ed with BC at 40 and  at 44.  Maternal aunt dx'ed with BC at 65 and is alive at 91.  Mother lung cancer at 75 and  at 89.  Father had stomach cancer at 83.  Paternal GM had stomach cancer at 83.  Pt does not have any brothers or  sisters.      HISTORY OF PRESENT ILLNESS:  Buffy River is a 59 y.o. female who is her for Breast cancer surveillance and  treatment follow-up. Today I have reviewed with the patient I will be conducting a clinical physical breast exam. Patient has declined a second medical professional today during the exam as a chapperone.     Continued on daily anastrazole. She has no breast cancer concerns today. She is doing well, is exercising.  She continues to keep weight off and is exercising daily.     She is still living in [place]. 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 She did stop Fosamax with GYN. She continues with weight bearing exercise and  "calcium rich diet + vit D      She denies any chest pain or breathing issues, no cough or short of breath     She denies any vision changes or headache issues, dizziness or loss of balance, no falls.      She denies any new or unexplained bone aches or pains. She has baseline issues with Left knee aches with improvement     She denies any skin lesions or masses, oral sores lesions or infections with exception to lipomas that are her normal.She reports random left palm random itching.       She reports a normal appetite, normal bowels, normal urination.      She reports stable issues with sleep. Denies fatigue.      She takes daily vit D & Calcium. She continues to stay active, plays pickle ball, walks, bikes.     Review of Systems - Oncology  ROS 14 points performed, See HPI for exceptions    OBJECTIVE:    VS / Pain:  /86   Pulse 74   Temp 36.7 °C (98 °F) (Temporal)   Resp 20   Ht 1.778 m (5' 10\")   LMP  (LMP Unknown)   SpO2 94%   BMI 28.12 kg/m²   BSA: 2.1 meters squared   Pain Scale: 0  ECO- Fully active, able to carry on all pre-disease performance w/o restriction.       Physical Exam    Constitutional: Well developed, awake/alert/oriented x4, no distress, alert and cooperative  EYES: Sclera clear  ENMT: mucous membranes moist, no apparent injury, no lesions seen  Head/Neck: Neck supple, no apparent injury, thyroid without mass or tenderness, No JVD, trachea midline, no bruits  Respiratory / Thoracic: Patent airways, clear to all lobes, normal breath sounds with good chest expansion, thorax symmetric.  Cardiovascular: Regular, rate and rhythm, no murmurs, 2+ equal pulses of the extremities, normal auscultated S 1and S 2  GI: Nondistended, soft, non-tender, no rebound tenderness or guarding, no masses palpable, no organomegaly, +BS, no bruits  Musculoskeletal: ROM intact, no joint swelling, normal strength, no spinal tenderness  Extremities: normal extremities, no cyanosis edema, contusions or " wounds, no clubbing  Neurological: alert and oriented x4, intact senses, motor, response and reflexes, normal strength  Breast: No masses, tenderness, no discharge or discoloration  in either  reconstructed breast; Implant reconstruction  Lymphatic: No cervical, supraclavicular, infraclavicular or axillary lymphadenopathy  Psychological: Appropriate and talkative mood and behavior  Skin: Warm and dry, no lesions, no rashes with the exception of baseline right and left forearm lipoma <1 cm, right lower back lipoma       Diagnostic Results   XR DEXA bone density  Narrative: Interpreted By:  Alfonso Penaloza,   STUDY:  DEXA BONE DENSITY1/8/2024 1:19 pm      INDICATION:  Signs/Symptoms: menopause  Z78.0: Menopause.  The patient is a 57 y/o  year old F.      COMPARISON:  None.      ACCESSION NUMBER(S):  [id number]      ORDERING CLINICIAN:  STANLEY HURLEY      TECHNIQUE:  DEXA BONE DENSITY      FINDINGS:  SPINE L1-L4  Bone Mineral Density: 1.125  T-Score -0.6  Z-Score 0.6  Bone Mineral Density change vs baseline:  Not reported  Bone Mineral Density change vs previous: Not reported      LEFT FEMUR -TOTAL  Bone Mineral Density: 0.895  T-Score -0.9   Z-Score  0.0  Bone Mineral Density change vs baseline: Not reported  Bone Mineral Density change vs previous: Not reported      LEFT FEMUR -NECK  Bone Mineral Density: 0.878  T-Score -1.2  Z-Score 0.1      RIGHT FEMUR -TOTAL  Bone Mineral Density: 0.867  T-Score -1.1   Z-Score -0.2  Bone Mineral Density change vs baseline:  Not reported  Bone Mineral Density change vs previous: Not reported      RIGHT FEMUR -NECK  Bone Mineral Density: 0.784  T-Score -1.8   Z-Score  -0.6          World Health Organization (WHO) criteria for post-menopausal,   Women:  Normal:         T-score at or above -1 SD  Osteopenia:   T-score between -1 and -2.5 SD  Osteoporosis: T-score at or below -2.5 SD          10-year Fracture Risk:  Major Osteoporotic Fracture  8.7  Hip Fracture                         1.0      Note:  If no FRAX score is reported, it is because:  Some T-score for Spine Total or Hip Total or Femoral Neck at or below  -2.5          This exam was performed at [facility] on a GE Lunar  Prodigy Advanced Dexa Unit.      Impression: DEXA:  According to World Health Organization criteria,  classification is low bone mass (osteopenia)      Followup recommended in two years or sooner as clinically warranted.          All images and detailed analysis are available on the  Radiology  PACS.      MACRO:  None      Signed by: Alfonso Penaloza 1/9/2024 1:58 PM  Dictation workstation:   [id number]               Assessment/Plan   Malignant neoplasm of left breast in female, estrogen receptor positive, Clinical: Stage IA (cT1b, cN0, cM0, G1, ER+, MD+, HER2-)  Malignant neoplasm of left breast in female, estrogen receptor positive, Pathologic: Stage IA (pT2, pN0, cM0, G1, ER+, MD+, HER2-)  Diagnoses and all orders for this visit:  Osteopenia, unspecified location (Primary)  Menopause  Malignant neoplasm of left breast in female, estrogen receptor positive, unspecified site of breast  Status post bilateral mastectomy  Aromatase inhibitor use  Encounter for follow-up surveillance of breast cancer      Problem List Items Addressed This Visit       Osteopenia - Primary    Status post bilateral mastectomy    Malignant neoplasm of left breast in female, estrogen receptor positive    Aromatase inhibitor use    Menopause    Encounter for follow-up surveillance of breast cancer     Breast Cancer  Left breast cancer recurrent to skin treated with excision, radiation and hormonal treatment with Lupron and anastrazole. She is s/p oophorectomy 5/31/2016. Anastrazole since 10/2015 and still tolerating this well. Previously discussed plan with Dr. Ortega because of skin recurrence, she remains at risk for another metastasis. Planned 10 year course of hormonal treatment through October 2025.       Genetic Testing performed in 2008 with first breast cancer.      There is no evidence of breast cancer recurrence based on her clinical exam today. She continues to follow in surveillance with myself and has been released from Maricarmen Medina CNP through 10 years however given 10 year plan with anastrozole I will see her through 11 th year. We have reviewed she will then transfer to PCP. She is agreeable with this plan    Bone Health  Nov 2021 T-score -1.7, essentially unchanged Repeat DEXA 1/2024 T-score -1.8. Continued encouragement weight bearing exercises, calcium and Vit D. Has stopped Fosamax with GYN. Orders placed for repeat DEXA for Spring 2026     General health Maintenance.  PCP Dr Mills on [place]  and GYN Dr Nice annually and as needed  Continued follow up with Dr Saba Domingo for thyroid cyst in [place]     Intermittent rare nausea, is likely not related to AI therapy, however I feel she has childhood trauma from mother being sick. Zofran prescription was last sent per GYN. I continue to encourage her to see a therapist to discuss fears of Nausea and vomiting          At least 20 minutes of direct consultation was spent with the patient today reviewing her cancer care plan, educating and answering questions regarding ongoing follow up, greater than 50% in counseling and coordination of care.     Treatment Plan:  [No matching plan found]      Thank you for the opportunity to be involved in the care of Buffy River.    We discussed the clinical significance of diagnosis, goals of care and treatment plan in detail.   Please do not hesitate to reach out with any questions. Thank you.     -------------------------------------------------------------------------------------------------------------------------------  Krystal Domingo MSN, APRN, FNP-C  [facility]  Division of Medical Oncology- Breast   Collaborating Physician Dr. Yang Pozo   [facility] Breast  Disease Team   [facility]  [id number] [place], [place] [address]  Phone: [phone]  Fax: [phone]  Available via Secure Chat    Confidential Peer Review Document-  Privilege  Privileged Pursuant to [place] Revised Code Section 2305.24, .25, .251 & .252

## 2025-11-05 ENCOUNTER — APPOINTMENT (OUTPATIENT)
Dept: OBSTETRICS AND GYNECOLOGY | Facility: CLINIC | Age: 60
End: 2025-11-05
Payer: COMMERCIAL